# Patient Record
Sex: FEMALE | URBAN - METROPOLITAN AREA
[De-identification: names, ages, dates, MRNs, and addresses within clinical notes are randomized per-mention and may not be internally consistent; named-entity substitution may affect disease eponyms.]

---

## 2024-02-19 ENCOUNTER — OV NP (OUTPATIENT)
Dept: URBAN - METROPOLITAN AREA CLINIC 27 | Facility: CLINIC | Age: 24
End: 2024-02-19

## 2024-02-22 ENCOUNTER — OV CON (OUTPATIENT)
Dept: URBAN - METROPOLITAN AREA CLINIC 27 | Facility: CLINIC | Age: 24
End: 2024-02-22
Payer: COMMERCIAL

## 2024-02-22 ENCOUNTER — LAB (OUTPATIENT)
Dept: URBAN - METROPOLITAN AREA CLINIC 27 | Facility: CLINIC | Age: 24
End: 2024-02-22

## 2024-02-22 VITALS
HEART RATE: 104 BPM | BODY MASS INDEX: 41.94 KG/M2 | RESPIRATION RATE: 18 BRPM | HEIGHT: 60 IN | DIASTOLIC BLOOD PRESSURE: 74 MMHG | SYSTOLIC BLOOD PRESSURE: 137 MMHG | WEIGHT: 213.6 LBS

## 2024-02-22 DIAGNOSIS — R19.7 ACUTE DIARRHEA: ICD-10-CM

## 2024-02-22 PROCEDURE — 99204 OFFICE O/P NEW MOD 45 MIN: CPT | Performed by: INTERNAL MEDICINE

## 2024-02-22 NOTE — HPI-TODAY'S VISIT:
23-year-old female here for diarrhea, rectal bleeding.  Symptoms started about 2 months ago.  Symptoms started after a trip to [place].  Blood is a small amount, mixed in with the stool.  Never had anything like this before.  No suspicious food intake.  No family history of IBD, celiac disease.  She went to [place], had stool studies that showed positive fecal occult blood, positive fecal leukocytes, negative culture, negative O&P, negative C. difficile.

## 2024-02-23 LAB
A/G RATIO: 1.6
ALBUMIN: 4.4
ALKALINE PHOSPHATASE: 68
ALT (SGPT): 11
AST (SGOT): 14
BILIRUBIN, TOTAL: 0.6
BUN/CREATININE RATIO: (no result)
BUN: 12
C-REACTIVE PROTEIN, QUANT: 1
CALCIUM: 9.5
CARBON DIOXIDE, TOTAL: 27
CHLORIDE: 105
CREATININE: 0.67
EGFR: 127
GLOBULIN, TOTAL: 2.8
GLUCOSE: 86
POTASSIUM: 4.2
PROTEIN, TOTAL: 7.2
SODIUM: 138

## 2024-03-01 ENCOUNTER — LAB (OUTPATIENT)
Dept: URBAN - METROPOLITAN AREA CLINIC 4 | Facility: CLINIC | Age: 24
End: 2024-03-01
Payer: COMMERCIAL

## 2024-03-01 ENCOUNTER — COLON (OUTPATIENT)
Dept: URBAN - METROPOLITAN AREA SURGERY CENTER 7 | Facility: SURGERY CENTER | Age: 24
End: 2024-03-01
Payer: COMMERCIAL

## 2024-03-01 DIAGNOSIS — K63.89 OTHER SPECIFIED DISEASES OF INTESTINE: ICD-10-CM

## 2024-03-01 DIAGNOSIS — K51.911 ULCERATIVE COLITIS, UNSPECIFIED WITH RECTAL BLEEDING: ICD-10-CM

## 2024-03-01 DIAGNOSIS — R19.7 ACUTE DIARRHEA: ICD-10-CM

## 2024-03-01 DIAGNOSIS — K51.00 ACUTE ULCERATIVE PANCOLITIS: ICD-10-CM

## 2024-03-01 PROCEDURE — 88341 IMHCHEM/IMCYTCHM EA ADD ANTB: CPT | Performed by: PATHOLOGY

## 2024-03-01 PROCEDURE — 88305 TISSUE EXAM BY PATHOLOGIST: CPT | Performed by: PATHOLOGY

## 2024-03-01 PROCEDURE — 45380 COLONOSCOPY AND BIOPSY: CPT | Performed by: INTERNAL MEDICINE

## 2024-03-01 PROCEDURE — 88342 IMHCHEM/IMCYTCHM 1ST ANTB: CPT | Performed by: PATHOLOGY

## 2024-03-28 ENCOUNTER — OV EP (OUTPATIENT)
Dept: URBAN - METROPOLITAN AREA CLINIC 27 | Facility: CLINIC | Age: 24
End: 2024-03-28
Payer: COMMERCIAL

## 2024-03-28 VITALS
WEIGHT: 200 LBS | BODY MASS INDEX: 39.27 KG/M2 | DIASTOLIC BLOOD PRESSURE: 74 MMHG | RESPIRATION RATE: 17 BRPM | HEIGHT: 60 IN | HEART RATE: 106 BPM | SYSTOLIC BLOOD PRESSURE: 134 MMHG

## 2024-03-28 DIAGNOSIS — K51.30 PROCTOCOLITIS, ULCERATIVE: ICD-10-CM

## 2024-03-28 PROCEDURE — 99213 OFFICE O/P EST LOW 20 MIN: CPT | Performed by: INTERNAL MEDICINE

## 2024-03-28 RX ORDER — MESALAMINE 1000 MG/1
1 SUPPOSITORY AT BEDTIME SUPPOSITORY RECTAL ONCE A DAY
Qty: 30 | Refills: 1 | Status: ACTIVE | COMMUNITY
Start: 2024-03-01 | End: 2024-04-30

## 2024-03-28 NOTE — HPI-TODAY'S VISIT:
23-year-old female with recently diagnosed ulcerative proctitis here for follow-up.  She was put on mesalamine suppositories after her colonoscopy 3 to 4 weeks ago.  She used the suppositories for about a week and her bleeding and diarrhea resolved.  Currently feeling well.  Bowel movements are regular.  Weight is stable.